# Patient Record
Sex: FEMALE | Race: WHITE
[De-identification: names, ages, dates, MRNs, and addresses within clinical notes are randomized per-mention and may not be internally consistent; named-entity substitution may affect disease eponyms.]

---

## 2018-05-15 NOTE — PCM.OPNOTE
- General Post-Op/Procedure Note


Date of Surgery/Procedure: 05/15/18


Operative Procedure(s): right breast biopsy


Findings: 





4x2 cm mass 


Pre Op Diagnosis: right breast mass.  fibroadenoma


Post-Op Diagnosis: Same


Anesthesia Technique: Local (10 ml 1 % lido with epi/0.5% buvipicaine), MAC


Primary Surgeon: David Vick


Anesthesia Provider: Madi Luna


Pathology: 





breast mass 


Complications: None


Condition: Good


Free Text/Narrative:: 





see dictation #[id number]

## 2018-05-15 NOTE — OR
DATE OF OPERATION:  05/15/2018

 

SURGEON:  David Vick MD

 

PROCEDURE PERFORMED:  Right breast biopsy.

 

PREOPERATIVE DIAGNOSIS:  Fibroadenoma of the right breast.

 

POSTOPERATIVE DIAGNOSIS:  Fibroadenoma of the right breast.

 

INDICATIONS FOR PROCEDURE:  This is a 38-year-old white female who has a known

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  She has undergone a core biopsy,

which is proven this.  Due to the lesion's large size, she desires excision as

this is tender and causes visible deformity of the breast.

 

DESCRIPTION OF PROCEDURE:  After an excellent IV sedation was administered, the

patient was prepped and draped in usual sterile manner.  The area surrounding

the fibroadenoma, which was located at the 8 to 9 o'clock position of the right

breast was infiltrated with 20 mL of 1:1 mixture of 1% lidocaine with

epinephrine 0.5% bupivacaine.  Incision was then made in the inframammary fold

and a combination of sharp and electrocautery dissection was used to deliver the

mass.  The mass was delivered.  It measured approximately 4 cm in diameter x

approximately 2 cm in width.  The specimen was passed off the field.  The area

was irrigated, and then, after ensuring excellent hemostasis, the incision was

closed using a running 4-0 Vicryl.  Dressing was applied.  Needle, sponge, and

instrument counts were reported as correct.  The patient was taken to recovery

room in good condition.

 

Job#: [id number]

DD: 05/15/2018 0846

DT: 05/15/2018 1620 AS/MODL

## 2022-05-25 ENCOUNTER — HOSPITAL ENCOUNTER (EMERGENCY)
Dept: HOSPITAL 7 - FB.ED | Age: 42
LOS: 1 days | Discharge: HOME | End: 2022-05-26
Payer: MEDICAID

## 2022-05-25 DIAGNOSIS — S93.401A: Primary | ICD-10-CM

## 2022-05-25 DIAGNOSIS — J45.909: ICD-10-CM
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
## 2022-05-25 DIAGNOSIS — Z79.899: ICD-10-CM

## 2022-05-25 DIAGNOSIS — Z88.1: ICD-10-CM

## 2022-05-25 DIAGNOSIS — Z91.010: ICD-10-CM

## 2022-05-25 DIAGNOSIS — Z88.0: ICD-10-CM

## 2022-05-25 DIAGNOSIS — I10: ICD-10-CM

## 2022-05-25 DIAGNOSIS — K21.9: ICD-10-CM

## 2022-05-25 DIAGNOSIS — E66.9: ICD-10-CM

## 2022-05-25 DIAGNOSIS — Z90.49: ICD-10-CM

## 2022-05-25 DIAGNOSIS — Z88.8: ICD-10-CM

## 2022-05-25 DIAGNOSIS — S93.601A: ICD-10-CM
